# Patient Record
Sex: FEMALE | Race: OTHER | NOT HISPANIC OR LATINO
[De-identification: names, ages, dates, MRNs, and addresses within clinical notes are randomized per-mention and may not be internally consistent; named-entity substitution may affect disease eponyms.]

---

## 2019-01-25 PROBLEM — Z00.00 ENCOUNTER FOR PREVENTIVE HEALTH EXAMINATION: Status: ACTIVE | Noted: 2019-01-25

## 2019-02-04 ENCOUNTER — APPOINTMENT (OUTPATIENT)
Dept: INTERVENTIONAL RADIOLOGY/VASCULAR | Facility: HOSPITAL | Age: 44
End: 2019-02-04
Payer: COMMERCIAL

## 2019-02-04 DIAGNOSIS — Z78.9 OTHER SPECIFIED HEALTH STATUS: ICD-10-CM

## 2019-02-04 DIAGNOSIS — D25.9 LEIOMYOMA OF UTERUS, UNSPECIFIED: ICD-10-CM

## 2019-02-04 DIAGNOSIS — E03.9 HYPOTHYROIDISM, UNSPECIFIED: ICD-10-CM

## 2019-02-04 PROCEDURE — 99244 OFF/OP CNSLTJ NEW/EST MOD 40: CPT

## 2019-02-06 VITALS
HEART RATE: 64 BPM | BODY MASS INDEX: 19.99 KG/M2 | HEIGHT: 69 IN | SYSTOLIC BLOOD PRESSURE: 122 MMHG | WEIGHT: 135 LBS | DIASTOLIC BLOOD PRESSURE: 80 MMHG

## 2019-02-06 PROBLEM — Z78.9 ALCOHOL USE: Status: ACTIVE | Noted: 2019-02-06

## 2019-02-06 PROBLEM — D25.9 FIBROID UTERUS: Status: ACTIVE | Noted: 2019-02-06

## 2019-02-06 PROBLEM — E03.9 HYPOTHYROIDISM, ADULT: Status: ACTIVE | Noted: 2019-02-06

## 2019-02-06 PROBLEM — Z78.9 DOES NOT USE TOBACCO: Status: ACTIVE | Noted: 2019-02-06

## 2019-02-06 RX ORDER — LEVOTHYROXINE SODIUM 137 UG/1
137 TABLET ORAL
Refills: 0 | Status: ACTIVE | COMMUNITY

## 2019-02-06 NOTE — ASSESSMENT
[FreeTextEntry1] : 43 year old with symptomatic fibroids.  Plan for hysteroscopic polypectomy by Dr Lucas followed by MRI pelvis.  If candidate for UAE and symptoms persist then patient will have UAE.

## 2019-02-06 NOTE — CONSULT LETTER
[Dear  ___] : Dear  [unfilled], [Consult Letter:] : I had the pleasure of evaluating your patient, [unfilled]. [Please see my note below.] : Please see my note below. [Consult Closing:] : Thank you very much for allowing me to participate in the care of this patient.  If you have any questions, please do not hesitate to contact me. [Sincerely,] : Sincerely, [FreeTextEntry3] : Pop Avila MD, FSIR\par Chief Interventional Radiology\par [facility]\par [facility]\par

## 2019-02-06 NOTE — PHYSICAL EXAM
[Alert] : alert [No Acute Distress] : no acute distress [Well Nourished] : well nourished [Well Developed] : well developed [Normal Sclera/Conjunctiva] : normal sclera/conjunctiva [EOMI] : extra occular movement intact [No Proptosis] : no proptosis [Normal Oropharynx] : the oropharynx was normal [No Neck Mass] : no neck mass was observed [Supple] : the neck was supple [No Respiratory Distress] : no respiratory distress [No Accessory Muscle Use] : no accessory muscle use [Clear to Auscultation] : lungs were clear to auscultation bilaterally [Normal Rate] : heart rate was normal  [Normal S1, S2] : normal S1 and S2 [Regular Rhythm] : with a regular rhythm [Pedal Pulses Normal] : the pedal pulses are present [No Edema] : there was no peripheral edema [Normal Bowel Sounds] : normal bowel sounds [Not Tender] : non-tender [Soft] : abdomen soft [Not Distended] : not distended [Normal Anterior Cervical Nodes] : anterior cervical nodes [No CVA Tenderness] : no ~M costovertebral angle tenderness [No Spinal Tenderness] : no spinal tenderness [Spine Straight] : spine straight [Normal Gait] : normal gait [Normal Strength/Tone] : muscle strength and tone were normal [No Rash] : no rash [Acanthosis Nigricans___] : no acanthosis nigricans [Normal Reflexes] : deep tendon reflexes were 2+ and symmetric [No Tremors] : no tremors [Oriented x3] : oriented to person, place, and time [Normal Affect] : the affect was normal [Normal Mood] : the mood was normal [de-identified] : uterus palpable to below umbilicus [Fully active, able to carry on all pre-disease performance without restriction] : Fully active, able to carry on all pre-disease performance without restriction

## 2019-02-06 NOTE — HISTORY OF PRESENT ILLNESS
[Dysmenorrhea] : dysmenorrhea [Menorrhagia] : ~T menorrhagia [Pelvic Pain] : pelvic pain [Urinary Frequency] : urinary frequency [Pressure] : pressure [Bloating] : bloating [Last Menstrual Period (___)] : Last menstrual period [unfilled] [Monthly Cycles Regular] : monthly cycles are regular [G ___] : G [unfilled] [P___] : P [unfilled] [FreeTextEntry1] : 43 year old first diagnosed with fibroids 2014.  Abdominal distention, pelvic pressure, frequent urination, menorrhagia 7 days all heavy change pads q hour, clots.  US and sonohysterogram demonstrate three fibroids largest measures 9 cm.  Pap 1/2019 negative.  No desire for future pregnancy [Bleeding In Between Periods] : no bleeding in between periods [Stable] : stable

## 2019-02-06 NOTE — CONSULT LETTER
[Dear  ___] : Dear  [unfilled], [Consult Letter:] : I had the pleasure of evaluating your patient, [unfilled]. [Please see my note below.] : Please see my note below. [Consult Closing:] : Thank you very much for allowing me to participate in the care of this patient.  If you have any questions, please do not hesitate to contact me. [Sincerely,] : Sincerely, [FreeTextEntry3] : Pop Avila MD, FSIR\par Chief Interventional Radiology\par [facility]\par [facility]\par

## 2019-02-06 NOTE — PHYSICAL EXAM
[Alert] : alert [No Acute Distress] : no acute distress [Well Nourished] : well nourished [Well Developed] : well developed [Normal Sclera/Conjunctiva] : normal sclera/conjunctiva [EOMI] : extra occular movement intact [No Proptosis] : no proptosis [Normal Oropharynx] : the oropharynx was normal [No Neck Mass] : no neck mass was observed [Supple] : the neck was supple [No Respiratory Distress] : no respiratory distress [No Accessory Muscle Use] : no accessory muscle use [Clear to Auscultation] : lungs were clear to auscultation bilaterally [Normal Rate] : heart rate was normal  [Normal S1, S2] : normal S1 and S2 [Regular Rhythm] : with a regular rhythm [Pedal Pulses Normal] : the pedal pulses are present [No Edema] : there was no peripheral edema [Normal Bowel Sounds] : normal bowel sounds [Not Tender] : non-tender [Soft] : abdomen soft [Not Distended] : not distended [Normal Anterior Cervical Nodes] : anterior cervical nodes [No CVA Tenderness] : no ~M costovertebral angle tenderness [No Spinal Tenderness] : no spinal tenderness [Spine Straight] : spine straight [Normal Gait] : normal gait [Normal Strength/Tone] : muscle strength and tone were normal [No Rash] : no rash [Acanthosis Nigricans___] : no acanthosis nigricans [Normal Reflexes] : deep tendon reflexes were 2+ and symmetric [No Tremors] : no tremors [Oriented x3] : oriented to person, place, and time [Normal Affect] : the affect was normal [Normal Mood] : the mood was normal [de-identified] : uterus palpable to below umbilicus [Fully active, able to carry on all pre-disease performance without restriction] : Fully active, able to carry on all pre-disease performance without restriction

## 2019-03-31 ENCOUNTER — FORM ENCOUNTER (OUTPATIENT)
Age: 44
End: 2019-03-31

## 2019-04-01 ENCOUNTER — APPOINTMENT (OUTPATIENT)
Dept: MRI IMAGING | Facility: HOSPITAL | Age: 44
End: 2019-04-01

## 2019-04-01 ENCOUNTER — OUTPATIENT (OUTPATIENT)
Dept: OUTPATIENT SERVICES | Facility: HOSPITAL | Age: 44
LOS: 1 days | End: 2019-04-01
Payer: COMMERCIAL

## 2019-04-01 PROCEDURE — 72197 MRI PELVIS W/O & W/DYE: CPT | Mod: 26

## 2019-12-08 ENCOUNTER — FORM ENCOUNTER (OUTPATIENT)
Age: 44
End: 2019-12-08

## 2019-12-09 ENCOUNTER — OUTPATIENT (OUTPATIENT)
Dept: OUTPATIENT SERVICES | Facility: HOSPITAL | Age: 44
LOS: 1 days | End: 2019-12-09
Payer: COMMERCIAL

## 2019-12-09 ENCOUNTER — APPOINTMENT (OUTPATIENT)
Dept: ULTRASOUND IMAGING | Facility: HOSPITAL | Age: 44
End: 2019-12-09
Payer: COMMERCIAL

## 2019-12-09 PROCEDURE — 76830 TRANSVAGINAL US NON-OB: CPT | Mod: 26

## 2019-12-09 PROCEDURE — 76856 US EXAM PELVIC COMPLETE: CPT | Mod: 26

## 2020-01-16 ENCOUNTER — OUTPATIENT (OUTPATIENT)
Dept: OUTPATIENT SERVICES | Facility: HOSPITAL | Age: 45
LOS: 1 days | End: 2020-01-16
Payer: COMMERCIAL

## 2020-01-16 DIAGNOSIS — Z01.818 ENCOUNTER FOR OTHER PREPROCEDURAL EXAMINATION: ICD-10-CM

## 2020-01-16 LAB
ALBUMIN SERPL ELPH-MCNC: 4.7 G/DL — SIGNIFICANT CHANGE UP (ref 3.3–5)
ALP SERPL-CCNC: 46 U/L — SIGNIFICANT CHANGE UP (ref 40–120)
ALT FLD-CCNC: 15 U/L — SIGNIFICANT CHANGE UP (ref 10–45)
ANION GAP SERPL CALC-SCNC: 13 MMOL/L — SIGNIFICANT CHANGE UP (ref 5–17)
APTT BLD: 29.6 SEC — SIGNIFICANT CHANGE UP (ref 27.5–36.3)
AST SERPL-CCNC: 25 U/L — SIGNIFICANT CHANGE UP (ref 10–40)
BILIRUB SERPL-MCNC: 0.4 MG/DL — SIGNIFICANT CHANGE UP (ref 0.2–1.2)
BUN SERPL-MCNC: 15 MG/DL — SIGNIFICANT CHANGE UP (ref 7–23)
CALCIUM SERPL-MCNC: 9.8 MG/DL — SIGNIFICANT CHANGE UP (ref 8.4–10.5)
CHLORIDE SERPL-SCNC: 101 MMOL/L — SIGNIFICANT CHANGE UP (ref 96–108)
CO2 SERPL-SCNC: 26 MMOL/L — SIGNIFICANT CHANGE UP (ref 22–31)
CREAT SERPL-MCNC: 0.71 MG/DL — SIGNIFICANT CHANGE UP (ref 0.5–1.3)
GLUCOSE SERPL-MCNC: 87 MG/DL — SIGNIFICANT CHANGE UP (ref 70–99)
HCG SERPL-ACNC: <0 MIU/ML — SIGNIFICANT CHANGE UP
HCT VFR BLD CALC: 40.6 % — SIGNIFICANT CHANGE UP (ref 34.5–45)
HGB BLD-MCNC: 13.3 G/DL — SIGNIFICANT CHANGE UP (ref 11.5–15.5)
INR BLD: 1.06 — SIGNIFICANT CHANGE UP (ref 0.88–1.16)
MCHC RBC-ENTMCNC: 31.8 PG — SIGNIFICANT CHANGE UP (ref 27–34)
MCHC RBC-ENTMCNC: 32.8 GM/DL — SIGNIFICANT CHANGE UP (ref 32–36)
MCV RBC AUTO: 97.1 FL — SIGNIFICANT CHANGE UP (ref 80–100)
NRBC # BLD: 0 /100 WBCS — SIGNIFICANT CHANGE UP (ref 0–0)
PLATELET # BLD AUTO: 294 K/UL — SIGNIFICANT CHANGE UP (ref 150–400)
POTASSIUM SERPL-MCNC: 4.5 MMOL/L — SIGNIFICANT CHANGE UP (ref 3.5–5.3)
POTASSIUM SERPL-SCNC: 4.5 MMOL/L — SIGNIFICANT CHANGE UP (ref 3.5–5.3)
PROT SERPL-MCNC: 7.1 G/DL — SIGNIFICANT CHANGE UP (ref 6–8.3)
PROTHROM AB SERPL-ACNC: 12.1 SEC — SIGNIFICANT CHANGE UP (ref 10–12.9)
RBC # BLD: 4.18 M/UL — SIGNIFICANT CHANGE UP (ref 3.8–5.2)
RBC # FLD: 12.7 % — SIGNIFICANT CHANGE UP (ref 10.3–14.5)
SODIUM SERPL-SCNC: 140 MMOL/L — SIGNIFICANT CHANGE UP (ref 135–145)
WBC # BLD: 6.31 K/UL — SIGNIFICANT CHANGE UP (ref 3.8–10.5)
WBC # FLD AUTO: 6.31 K/UL — SIGNIFICANT CHANGE UP (ref 3.8–10.5)

## 2020-01-16 PROCEDURE — 93010 ELECTROCARDIOGRAM REPORT: CPT

## 2020-02-27 ENCOUNTER — TRANSCRIPTION ENCOUNTER (OUTPATIENT)
Age: 45
End: 2020-02-27

## 2020-06-10 ENCOUNTER — OUTPATIENT (OUTPATIENT)
Dept: OUTPATIENT SERVICES | Facility: HOSPITAL | Age: 45
LOS: 1 days | End: 2020-06-10
Payer: COMMERCIAL

## 2020-06-10 ENCOUNTER — LABORATORY RESULT (OUTPATIENT)
Age: 45
End: 2020-06-10

## 2020-06-10 DIAGNOSIS — Z01.818 ENCOUNTER FOR OTHER PREPROCEDURAL EXAMINATION: ICD-10-CM

## 2020-06-10 LAB
ALBUMIN SERPL ELPH-MCNC: 4.3 G/DL — SIGNIFICANT CHANGE UP (ref 3.3–5)
ALP SERPL-CCNC: 44 U/L — SIGNIFICANT CHANGE UP (ref 40–120)
ALT FLD-CCNC: 13 U/L — SIGNIFICANT CHANGE UP (ref 10–45)
ANION GAP SERPL CALC-SCNC: 9 MMOL/L — SIGNIFICANT CHANGE UP (ref 5–17)
APTT BLD: 28.6 SEC — SIGNIFICANT CHANGE UP (ref 27.5–36.3)
AST SERPL-CCNC: 23 U/L — SIGNIFICANT CHANGE UP (ref 10–40)
BILIRUB SERPL-MCNC: 0.4 MG/DL — SIGNIFICANT CHANGE UP (ref 0.2–1.2)
BUN SERPL-MCNC: 11 MG/DL — SIGNIFICANT CHANGE UP (ref 7–23)
CALCIUM SERPL-MCNC: 9.7 MG/DL — SIGNIFICANT CHANGE UP (ref 8.4–10.5)
CHLORIDE SERPL-SCNC: 105 MMOL/L — SIGNIFICANT CHANGE UP (ref 96–108)
CO2 SERPL-SCNC: 25 MMOL/L — SIGNIFICANT CHANGE UP (ref 22–31)
CREAT SERPL-MCNC: 0.76 MG/DL — SIGNIFICANT CHANGE UP (ref 0.5–1.3)
GLUCOSE SERPL-MCNC: 81 MG/DL — SIGNIFICANT CHANGE UP (ref 70–99)
HCT VFR BLD CALC: 41.4 % — SIGNIFICANT CHANGE UP (ref 34.5–45)
HGB BLD-MCNC: 13.9 G/DL — SIGNIFICANT CHANGE UP (ref 11.5–15.5)
INR BLD: 0.99 — SIGNIFICANT CHANGE UP (ref 0.88–1.16)
MCHC RBC-ENTMCNC: 32.1 PG — SIGNIFICANT CHANGE UP (ref 27–34)
MCHC RBC-ENTMCNC: 33.6 GM/DL — SIGNIFICANT CHANGE UP (ref 32–36)
MCV RBC AUTO: 95.6 FL — SIGNIFICANT CHANGE UP (ref 80–100)
NRBC # BLD: 0 /100 WBCS — SIGNIFICANT CHANGE UP (ref 0–0)
PLATELET # BLD AUTO: 253 K/UL — SIGNIFICANT CHANGE UP (ref 150–400)
POTASSIUM SERPL-MCNC: 4.5 MMOL/L — SIGNIFICANT CHANGE UP (ref 3.5–5.3)
POTASSIUM SERPL-SCNC: 4.5 MMOL/L — SIGNIFICANT CHANGE UP (ref 3.5–5.3)
PROT SERPL-MCNC: 6.8 G/DL — SIGNIFICANT CHANGE UP (ref 6–8.3)
PROTHROM AB SERPL-ACNC: 11.3 SEC — SIGNIFICANT CHANGE UP (ref 10–12.9)
RBC # BLD: 4.33 M/UL — SIGNIFICANT CHANGE UP (ref 3.8–5.2)
RBC # FLD: 12.8 % — SIGNIFICANT CHANGE UP (ref 10.3–14.5)
SODIUM SERPL-SCNC: 139 MMOL/L — SIGNIFICANT CHANGE UP (ref 135–145)
WBC # BLD: 5.3 K/UL — SIGNIFICANT CHANGE UP (ref 3.8–10.5)
WBC # FLD AUTO: 5.3 K/UL — SIGNIFICANT CHANGE UP (ref 3.8–10.5)

## 2020-06-10 PROCEDURE — 93010 ELECTROCARDIOGRAM REPORT: CPT

## 2020-06-12 ENCOUNTER — OUTPATIENT (OUTPATIENT)
Dept: OUTPATIENT SERVICES | Facility: HOSPITAL | Age: 45
LOS: 1 days | End: 2020-06-12
Payer: COMMERCIAL

## 2020-06-12 ENCOUNTER — RESULT REVIEW (OUTPATIENT)
Age: 45
End: 2020-06-12

## 2020-06-12 ENCOUNTER — APPOINTMENT (OUTPATIENT)
Dept: INTERVENTIONAL RADIOLOGY/VASCULAR | Facility: HOSPITAL | Age: 45
End: 2020-06-12

## 2020-06-12 PROCEDURE — 36247 INS CATH ABD/L-EXT ART 3RD: CPT | Mod: 59

## 2020-06-12 PROCEDURE — 37243 VASC EMBOLIZE/OCCLUDE ORGAN: CPT

## 2020-06-12 RX ORDER — ONDANSETRON 8 MG/1
1 TABLET, FILM COATED ORAL
Qty: 12 | Refills: 0
Start: 2020-06-12 | End: 2020-06-15

## 2020-06-12 RX ORDER — OXYCODONE HYDROCHLORIDE 5 MG/1
1 TABLET ORAL
Qty: 16 | Refills: 0
Start: 2020-06-12 | End: 2020-06-15

## 2020-06-12 RX ORDER — OXYCODONE HYDROCHLORIDE 5 MG/1
1 TABLET ORAL
Qty: 12 | Refills: 0
Start: 2020-06-12 | End: 2020-06-19

## 2020-06-12 RX ORDER — IBUPROFEN 200 MG
1 TABLET ORAL
Qty: 40 | Refills: 0
Start: 2020-06-12 | End: 2020-06-21

## 2020-06-25 RX ORDER — TRAMADOL HYDROCHLORIDE 50 MG/1
1 TABLET ORAL
Qty: 20 | Refills: 0
Start: 2020-06-25 | End: 2020-06-29

## 2020-07-06 ENCOUNTER — APPOINTMENT (OUTPATIENT)
Dept: INTERVENTIONAL RADIOLOGY/VASCULAR | Facility: HOSPITAL | Age: 45
End: 2020-07-06

## 2020-10-13 ENCOUNTER — RESULT REVIEW (OUTPATIENT)
Age: 45
End: 2020-10-13

## 2020-10-13 ENCOUNTER — APPOINTMENT (OUTPATIENT)
Dept: MRI IMAGING | Facility: HOSPITAL | Age: 45
End: 2020-10-13

## 2020-10-13 ENCOUNTER — APPOINTMENT (OUTPATIENT)
Dept: INTERVENTIONAL RADIOLOGY/VASCULAR | Facility: HOSPITAL | Age: 45
End: 2020-10-13

## 2020-10-13 ENCOUNTER — OUTPATIENT (OUTPATIENT)
Dept: OUTPATIENT SERVICES | Facility: HOSPITAL | Age: 45
LOS: 1 days | End: 2020-10-13
Payer: COMMERCIAL

## 2020-10-20 ENCOUNTER — APPOINTMENT (OUTPATIENT)
Dept: INTERVENTIONAL RADIOLOGY/VASCULAR | Facility: HOSPITAL | Age: 45
End: 2020-10-20

## 2020-12-21 ENCOUNTER — APPOINTMENT (OUTPATIENT)
Dept: INTERVENTIONAL RADIOLOGY/VASCULAR | Facility: HOSPITAL | Age: 45
End: 2020-12-21

## 2022-05-16 ENCOUNTER — EMERGENCY (EMERGENCY)
Facility: HOSPITAL | Age: 47
LOS: 1 days | Discharge: HOME | End: 2022-05-16
Attending: EMERGENCY MEDICINE | Admitting: EMERGENCY MEDICINE
Payer: MEDICAID

## 2022-05-16 VITALS
TEMPERATURE: 97 F | RESPIRATION RATE: 18 BRPM | DIASTOLIC BLOOD PRESSURE: 66 MMHG | SYSTOLIC BLOOD PRESSURE: 112 MMHG | HEART RATE: 69 BPM | OXYGEN SATURATION: 98 %

## 2022-05-16 VITALS
HEART RATE: 66 BPM | TEMPERATURE: 97 F | OXYGEN SATURATION: 98 % | SYSTOLIC BLOOD PRESSURE: 118 MMHG | WEIGHT: 134.92 LBS | HEIGHT: 69 IN | DIASTOLIC BLOOD PRESSURE: 77 MMHG | RESPIRATION RATE: 20 BRPM

## 2022-05-16 DIAGNOSIS — R10.31 RIGHT LOWER QUADRANT PAIN: ICD-10-CM

## 2022-05-16 PROCEDURE — 73502 X-RAY EXAM HIP UNI 2-3 VIEWS: CPT | Mod: 26,RT

## 2022-05-16 PROCEDURE — 73502 X-RAY EXAM HIP UNI 2-3 VIEWS: CPT | Mod: 26

## 2022-05-16 PROCEDURE — 99284 EMERGENCY DEPT VISIT MOD MDM: CPT | Mod: 25

## 2022-05-16 RX ORDER — KETOROLAC TROMETHAMINE 30 MG/ML
30 SYRINGE (ML) INJECTION ONCE
Refills: 0 | Status: DISCONTINUED | OUTPATIENT
Start: 2022-05-16 | End: 2022-05-16

## 2022-05-16 RX ORDER — ACETAMINOPHEN 500 MG
650 TABLET ORAL ONCE
Refills: 0 | Status: COMPLETED | OUTPATIENT
Start: 2022-05-16 | End: 2022-05-16

## 2022-05-16 RX ADMIN — Medication 650 MILLIGRAM(S): at 01:25

## 2022-05-16 RX ADMIN — Medication 30 MILLIGRAM(S): at 01:26

## 2022-05-16 NOTE — ED PROVIDER NOTE - OBJECTIVE STATEMENT
47F denies PMH p/w groin pain. ~1mo ago while stretching pt developed pain to R inguinal ligament region, improved after 1week but has still been present for last 4 weeks. Today while walking pain gradually became more severe, radiating to R back and R knee, also hears "clicking" sound at her R hip. took 800mg ibuprofen and then 1/2 percocet w/ minimal relief so came to ED. No other systemic symptoms. Pain worse w/ walking.   Denies HA, f/c, NVD, abd pain, urinary complaints, focal weakness/numbness. Normal PO intake/appetite.

## 2022-05-16 NOTE — ED PROVIDER NOTE - CLINICAL SUMMARY MEDICAL DECISION MAKING FREE TEXT BOX
47F denies PMH p/w groin pain. ~1mo ago while stretching pt developed pain to R inguinal ligament region, improved after 1week but has still been present for last 4 weeks. Today while walking pain gradually became more severe, radiating to R back and R knee, also hears "clicking" sound at her R hip. took 800mg ibuprofen and then 1/2 percocet w/ minimal relief so came to ED. No other systemic symptoms.   Vitals wnl, exam as above.  ddx: Likely muscle/ligament strain/sprain, possible radiculopathy. Possibly related to inguinal ligament or iliotibial band. Clinically not infectious.   XR, symptom control, reassess. Likely outpt f/u.

## 2022-05-16 NOTE — ED PROVIDER NOTE - PATIENT PORTAL LINK FT
You can access the FollowMyHealth Patient Portal offered by [facility] by registering at the following website: [email or web address]. By joining [facility]’s FollowMyHealth portal, you will also be able to view your health information using other applications (apps) compatible with our system.

## 2022-05-16 NOTE — ED ADULT NURSE NOTE - OBJECTIVE STATEMENT
Pt reports injury to R groin 1 month ago. Pt reports yesterday she was walking and felt "something pop" in groin. Pt reports worsening R sided groin pain with radiation to R leg, back, and hip. Pt states she tok ibuprofen without any pain relief. Pt able to ambulate with partial weight to R side. Pt denies any numbness/tingling/weakness.

## 2022-05-16 NOTE — ED PROVIDER NOTE - NSFOLLOWUPINSTRUCTIONS_ED_ALL_ED_FT
Your symptoms are likely related to a strain/sprain of inguinal ligament or iliotibial band.    Can take tylenol 650mg every 6hrs as needed for pain.  Can also take motrin 600mg every 6hrs as needed for pain (take with food, use may cause stomach issues).  Follow up with primary doctor within 1-2 days.  Follow up with orthopedist. Can call [phone] to schedule appointment.   Return for fevers, persistent vomit, uncontrolled pain, focal weakness/numbness, worsening swelling, spreading redness.    Joint Pain    Joint pain (arthralgia) may be caused by many things. Joint pain is likely to go away when you follow instructions from your health care provider for relieving pain at home. However, joint pain can also be caused by conditions that require more treatment. Common causes of joint pain include:  Bruising in the area of the joint.  Injury caused by repeating certain movements too many times (overuse injury).  Age-related joint wear and tear (osteoarthritis).  Buildup of uric acid crystals in the joint (gout).  Inflammation of the joint (rheumatic disease).  Various other forms of arthritis.  Infections of the joint (septic arthritis) or of the bone (osteomyelitis).    Your health care provider may recommend that you take pain medicine or wear a supportive device like an elastic bandage, sling, or splint. If your joint pain continues, you may need lab or imaging tests to diagnose the cause of your joint pain.    Follow these instructions at home:  Managing pain, stiffness, and swelling   If directed, put ice on the painful area. Icing can help to relieve joint pain and swelling.  Put ice in a plastic bag.  Place a towel between your skin and the bag.  Leave the ice on for 20 minutes, 2–3 times a day.  If directed, apply heat to the painful area as often as told by your health care provider. Heat can reduce the stiffness of your muscles and joints. Use the heat source that your health care provider recommends, such as a moist heat pack or a heating pad.  Place a towel between your skin and the heat source.  Leave the heat on for 20–30 minutes.  Remove the heat if your skin turns bright red. This is especially important if you are unable to feel pain, heat, or cold. You may have a greater risk of getting burned.  Move your fingers or toes below the painful joint often. You can avoid stiffness and lessen swelling by doing this.  If possible, raise (elevate) the painful joint above the level of your heart while you are sitting or lying down. To do this, try putting a few pillows under the painful joint.    Activity   Rest the painful joint for as long as directed. Do not do anything that causes or worsens pain.  Begin exercising or stretching the affected area, as told by your health care provider. Ask your health care provider what types of exercise are safe for you.    If you have an elastic bandage, sling, or splint:   Wear the supportive device as told by your health care provider. Remove it only as told by your health care provider.  Loosen the device if your fingers or toes below the joint tingle, become numb, or turn cold and blue.  Keep the device clean.   Ask your health care provider if you should remove the device before bathing. You may need to cover it with a watertight covering when you take a bath or a shower.    General instructions   Take over-the-counter and prescription medicines only as told by your health care provider.  Do not use any products that contain nicotine or tobacco, such as cigarettes and e-cigarettes. If you need help quitting, ask your health care provider.  Keep all follow-up visits as told by your health care provider. This is important.  Contact a health care provider if:  You have pain that gets worse and does not get better with medicine.  Your joint pain does not improve within 3 days.  You have increased bruising or swelling.  You have a fever.  You lose 10 lb (4.5 kg) or more without trying.    Get help right away if:  You cannot move the joint.  Your fingers or toes tingle, become numb, or turn cold and blue.  You have a fever along with a joint that is red, warm, and swollen.    Summary  Joint pain (arthralgia) may be caused by many things.  Your health care provider may recommend that you take pain medicine or wear a supportive device like an elastic bandage, sling, or splint.  If your joint pain continues, you may need tests to diagnose the cause of your joint pain.  Take over-the-counter and prescription medicines only as told by your health care provider. Your symptoms are likely related to a strain/sprain/overuse injury of inguinal ligament or iliotibial band.    Can take tylenol 650mg every 6hrs as needed for pain.  Can also take motrin 600mg every 6hrs as needed for pain (take with food, use may cause stomach issues).  Follow up with primary doctor within 1-2 days.  Follow up with orthopedist. Can call [phone] to schedule appointment.   Return for fevers, persistent vomit, uncontrolled pain, focal weakness/numbness, worsening swelling, spreading redness.    Joint Pain    Joint pain (arthralgia) may be caused by many things. Joint pain is likely to go away when you follow instructions from your health care provider for relieving pain at home. However, joint pain can also be caused by conditions that require more treatment. Common causes of joint pain include:  Bruising in the area of the joint.  Injury caused by repeating certain movements too many times (overuse injury).  Age-related joint wear and tear (osteoarthritis).  Buildup of uric acid crystals in the joint (gout).  Inflammation of the joint (rheumatic disease).  Various other forms of arthritis.  Infections of the joint (septic arthritis) or of the bone (osteomyelitis).    Your health care provider may recommend that you take pain medicine or wear a supportive device like an elastic bandage, sling, or splint. If your joint pain continues, you may need lab or imaging tests to diagnose the cause of your joint pain.    Follow these instructions at home:  Managing pain, stiffness, and swelling   If directed, put ice on the painful area. Icing can help to relieve joint pain and swelling.  Put ice in a plastic bag.  Place a towel between your skin and the bag.  Leave the ice on for 20 minutes, 2–3 times a day.  If directed, apply heat to the painful area as often as told by your health care provider. Heat can reduce the stiffness of your muscles and joints. Use the heat source that your health care provider recommends, such as a moist heat pack or a heating pad.  Place a towel between your skin and the heat source.  Leave the heat on for 20–30 minutes.  Remove the heat if your skin turns bright red. This is especially important if you are unable to feel pain, heat, or cold. You may have a greater risk of getting burned.  Move your fingers or toes below the painful joint often. You can avoid stiffness and lessen swelling by doing this.  If possible, raise (elevate) the painful joint above the level of your heart while you are sitting or lying down. To do this, try putting a few pillows under the painful joint.    Activity   Rest the painful joint for as long as directed. Do not do anything that causes or worsens pain.  Begin exercising or stretching the affected area, as told by your health care provider. Ask your health care provider what types of exercise are safe for you.    If you have an elastic bandage, sling, or splint:   Wear the supportive device as told by your health care provider. Remove it only as told by your health care provider.  Loosen the device if your fingers or toes below the joint tingle, become numb, or turn cold and blue.  Keep the device clean.   Ask your health care provider if you should remove the device before bathing. You may need to cover it with a watertight covering when you take a bath or a shower.    General instructions   Take over-the-counter and prescription medicines only as told by your health care provider.  Do not use any products that contain nicotine or tobacco, such as cigarettes and e-cigarettes. If you need help quitting, ask your health care provider.  Keep all follow-up visits as told by your health care provider. This is important.  Contact a health care provider if:  You have pain that gets worse and does not get better with medicine.  Your joint pain does not improve within 3 days.  You have increased bruising or swelling.  You have a fever.  You lose 10 lb (4.5 kg) or more without trying.    Get help right away if:  You cannot move the joint.  Your fingers or toes tingle, become numb, or turn cold and blue.  You have a fever along with a joint that is red, warm, and swollen.    Summary  Joint pain (arthralgia) may be caused by many things.  Your health care provider may recommend that you take pain medicine or wear a supportive device like an elastic bandage, sling, or splint.  If your joint pain continues, you may need tests to diagnose the cause of your joint pain.  Take over-the-counter and prescription medicines only as told by your health care provider.

## 2022-05-16 NOTE — ED PROVIDER NOTE - PROGRESS NOTE DETAILS
Klepfish: pain improving. Discussed importance of outpt follow up and return precautions. Clinically no indication for further emergent ED workup or hospitalization at this time. Comfortable for dc, outpt f/u.

## 2022-05-16 NOTE — ED PROVIDER NOTE - PHYSICAL EXAMINATION
pain w/ R hip flexion  No spinal ttp, neck FROM. Strength 5/5. No bony ttp, FROM all extremities. Normal equal distal pulses. Steady unassisted gait.   no LE edema, normal equal distal pulses, steady unassisted gait.   no palpable inguinal hernias

## 2022-05-25 ENCOUNTER — APPOINTMENT (OUTPATIENT)
Dept: ORTHOPEDIC SURGERY | Facility: CLINIC | Age: 47
End: 2022-05-25
Payer: MEDICAID

## 2022-05-25 VITALS
HEIGHT: 69 IN | BODY MASS INDEX: 19.99 KG/M2 | SYSTOLIC BLOOD PRESSURE: 157 MMHG | DIASTOLIC BLOOD PRESSURE: 67 MMHG | OXYGEN SATURATION: 97 % | TEMPERATURE: 98.5 F | HEART RATE: 75 BPM | WEIGHT: 135 LBS

## 2022-05-25 DIAGNOSIS — M89.9 DISORDER OF BONE, UNSPECIFIED: ICD-10-CM

## 2022-05-25 PROCEDURE — 99204 OFFICE O/P NEW MOD 45 MIN: CPT

## 2022-05-25 RX ORDER — MELOXICAM 15 MG/1
15 TABLET ORAL
Qty: 28 | Refills: 1 | Status: ACTIVE | COMMUNITY
Start: 2022-05-25 | End: 1900-01-01

## 2022-05-25 NOTE — REVIEW OF SYSTEMS
[Negative] : Heme/Lymph [Joint Pain] : joint pain [Joint Stiffness] : joint stiffness [Joint Swelling] : joint swelling [Feeling Tired] : fatigue [Recent Weight Gain (___ Lbs)] : recent ~M [unfilled] lbs weight gain

## 2022-05-25 NOTE — PHYSICAL EXAM
[de-identified] : General: Well-nourished, well-developed, alert, and in no acute distress.\par Head: Normocephalic.\par Eyes: Pupils equal round reactive to light and accommodation, extraocular muscles intact, normal sclera.\par Nose: No nasal discharge.\par Cardiac: Regular rate. Extremities are warm and well perfused. Distal pulses are symmetric bilaterally.\par Respiratory: No labored breathing.\par Extremities: Sensation is intact distally bilaterally.  Distal pulses are symmetric bilaterally\par Lymphatic: No regional lymphadenopathy, no lymphedema\par Neurologic: No focal deficits\par Skin: Normal skin color, texture, and turgor\par Psychiatric: Normal affect\par MSK: as noted above/below\par \par \par \par LEFT HIP:\par \par Inspection: no bruising, erythema, rash, deformity \par Palpation: No Greater Trochanter pain , ITB pain , Hip Flexor pain  , Piriformis pain , Proximal Hamstring Pain , Groin pain \par ROM: normal Internal Rotation , External Rotation\par Strength: 5/5 Hip Flexion, Hip Extension, Hip Abduction, Hip Adduction\par \par Distal Pulses: normal\par Lower Extremity Sensation: normal \par Patellar/Achilles Reflex: normal\par \par Special Tests:\par Stinchfield: NEGATIVE \par Log Roll: NEGATIVE\par FABERE: NEGATIVE\par FADIR: NEGATIVE\par Obers: NEGATIVE\par Resisted Sit-Up: NEGATIVE\par \par RIGHT HIP:\par \par Inspection: no bruising, erythema, rash, deformity \par Palpation: PAIN AT PUBIC SYMPHISIS/ADDUCTOR TUBERCLE/ADDUCTOR FOOTPRINT, MILD PAIN AT HIP FLEXOR, No Greater Trochanter pain , ITB pain ,, Piriformis pain , Proximal Hamstring Pain , \par ROM: MILDLY INCREASED Internal Rotation , External Rotation\par Strength: 5-/5 Hip Flexion, Hip Extension, Hip Abduction, Hip Adduction\par \par Distal Pulses: normal\par Lower Extremity Sensation: normal \par Patellar/Achilles Reflex: normal\par \par Special Tests:\par Stinchfield: POSITIVE\par Log Roll: NEGATIVE\par FABERE: POSITIVE\par FADIR: POSITIVE\par Obers: NEGATIVE\par Resisted Sit-Up: POSITIVE\par \par  [de-identified] : Xray Pelvis and Bilateral Hip  - Multiple views were reviewed with the patient in detail.  There is no acute fracture or dislocation.  There is no joint effusion.  Joint spaces are preserved.\par

## 2022-05-25 NOTE — HISTORY OF PRESENT ILLNESS
[10] : a current pain level of 10/10 [de-identified] : The patient is a 47 year old woman presenting with right hip pain\par \par She is a former professional dancer.  Until recently, she was doing recreational ballet 2-3 times weekly.\par \par She presents with a 2 month history of chronic right anteromedial hip and groin pain.  She first noticed pain during a ballet class, when she was on a roller, and went into hyperextension of the hip and felt a sharp sensation in her groin area.  She now complains of chronic pain with limited bouts of walking with associated clicking and catching sensations.  Pain is persistent.  Pain radiates into medial thigh near knee as well.  No radiation below knee.  She has history of uterine fibroids, but pain is different in character.  no GI/ symptoms.\par \par Pain is rated 10/10, described as dull, improved with rest, worse with walking.

## 2022-05-25 NOTE — DISCUSSION/SUMMARY
[Medication Risks Reviewed] : Medication risks reviewed [de-identified] : The patient is a 47 year old woman presenting with a 2 month history of chronic, persistent right anteromedial hip pain with mechanical symptoms.  Suspect ANN with labral tear and overlapping athletic pubalgia, cannot rule out proximal adductor tendon tear.\par \par Imaging/Diagnostics/Medical Records were interpreted and/or reviewed and results were reviewed with the patient in detail.  All questions were answered appropriately.\par \par The patient was counseled on the natural progression of the problem(s) today and potential complications of diagnoses.  The patient was provided reassurance today.\par \par MRI of the pelvis ordered today.\par \par MR Arthrogram right hip ordered today.\par \par Patient has exhausted conservative treatment including relative rest, activity modification, at least 8 weeks of physician-directed home exercise program/physical therapy, pharmacologics, observation.  IT IS MEDICALLY NECESSARY TO APPROVE THE FOLLOWING DIAGNOSTIC IMAGING.\par \par Patient was prescribed a short course of Meloxicam.Appropriate use of medication was reviewed with the patient in detail. Risks, benefits, and adverse effects medication were discussed.\par \par Patient was prescribed a short course of Tramadol for breakthrough pain.Appropriate use of medication was reviewed with the patient in detail. Risks, benefits, and adverse effects medication were discussed.\par \par Follow-up after MRI.  Pending results, discussed referral to hip preservation surgery.\par \par ------------------------------------------------------------------------------------------------------------------\par Patient appreciates and agrees with current plan.\par \par The patient's diagnosis above was evaluated by me, personally.  Diagnostic Testing and treatment options were discussed with the patient in detail.  The risks/benefits/potential complications of diagnostic testing/treatments were described in detail.  \par \par This note was generated using a mixture of manual typing and dragon medical dictation software.  A reasonable effort has been made for proofreading its contents, but typos may still remain.  If there are any questions or points of clarification needed please notify my office.\par \par \par >45 minutes of time was spent on total encounter.  >50% of the visit was spent on face-to-face counseling/coordination of care and medical-decision making for this patient.\par \par \par \par

## 2022-06-14 ENCOUNTER — APPOINTMENT (OUTPATIENT)
Dept: ORTHOPEDIC SURGERY | Facility: CLINIC | Age: 47
End: 2022-06-14
Payer: MEDICAID

## 2022-06-14 DIAGNOSIS — M76.892 OTHER SPECIFIED ENTHESOPATHIES OF LEFT LOWER LIMB, EXCLUDING FOOT: ICD-10-CM

## 2022-06-14 DIAGNOSIS — M76.891 OTHER SPECIFIED ENTHESOPATHIES OF RIGHT LOWER LIMB, EXCLUDING FOOT: ICD-10-CM

## 2022-06-14 PROCEDURE — 99212 OFFICE O/P EST SF 10 MIN: CPT

## 2022-06-14 NOTE — HISTORY OF PRESENT ILLNESS
[de-identified] : The patient is a 47 year old woman presenting with right hip pain\par \par She is a former professional dancer.  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  She first noticed pain during a ballet class, when she was on a roller, and went into hyperextension of the hip and felt a sharp sensation in her groin area.  She now complains of chronic pain with limited bouts of walking with associated clicking and catching sensations.  Pain is persistent.  Pain radiates into medial thigh near knee as well.  No radiation below knee.  She has history of uterine fibroids, but pain is different in character.  no GI/ symptoms.\par \par She was sent for MRI Pelvis and MR Arthrogram Right Hip showing:\par MRI of the bony pelvis demonstrates:\par \par 1.  Unremarkable pubic symphysis with intact adductor and aponeurotic attachments.\par 2.  Enlarged fibroid uterus, largest measuring up to 9.5 cm. Bilateral adnexal cysts measuring up to 4 cm the right and 2.4 cm on the left.\par \par MRI of the right hip demonstrates:\par \par 1.  Nondisplaced anterosuperior acetabular labral tear.\par 2.  Focal high-grade articular cartilage defect of the superior femoral head (0.7 x 0.9 cm).\par 3.  Mild gluteus minimus tendinosis and peritendinitis.\par 4.  Mild hamstring tendinosis.\par 5.  Fibroid uterus. 3.5 cm right adnexal cyst

## 2022-06-14 NOTE — DISCUSSION/SUMMARY
[de-identified] : The patient is a 47 year old woman presenting with a >2 month history of chronic, persistent right anteromedial hip pain with mechanical symptoms.  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  She has bilateral adductor tendinitis.\par \par Imaging/Diagnostics/Medical Records were interpreted and/or reviewed and results were reviewed with the patient in detail.  All questions were answered appropriately.\par \par The patient was counseled on the natural progression of the problem(s) today and potential complications of diagnoses.  The patient was provided reassurance today.\par \par Trial PT.\par \par Patient was prescribed a course of physical therapy today.  The patient was also provided some general home exercises.  The patient was counseled on activity modification.\par \par I explained to the patient that I will be leaving at the end of July 2022.  With established patients, I will continue to provide care during that time.  I explained that the Orthopedic team will be sending out letters regarding my departure and we have provided referrals in-office as requested.\par \par She was given referral to Dr. Jacobo.\par \par ------------------------------------------------------------------------------------------------------------------\par Patient appreciates and agrees with current plan.\par \par The patient's diagnosis above was evaluated by me, personally.  Diagnostic Testing and treatment options were discussed with the patient in detail.  The risks/benefits/potential complications of diagnostic testing/treatments were described in detail.  \par \par This note was generated using a mixture of manual typing and dragon medical dictation software.  A reasonable effort has been made for proofreading its contents, but typos may still remain.  If there are any questions or points of clarification needed please notify my office.\par \par I explained to the patient that I will be leaving at the end of July 2022.  With established patients, I will continue to provide care during that time.  I explained that the Orthopedic team will be sending out letters regarding my departure and we have provided referrals in-office as requested.\par >15 minutes of time was spent in total for the encounter.  >50% of the time spent was on face-to-face counseling/coordination of care and medical-decision making for this patient.\par \par \par

## 2022-06-14 NOTE — PHYSICAL EXAM
[de-identified] : General: Well-nourished, well-developed, alert, and in no acute distress.\par Head: Normocephalic.\par Eyes: Pupils equal round reactive to light and accommodation, extraocular muscles intact, normal sclera.\par Nose: No nasal discharge.\par Cardiac: Regular rate. Extremities are warm and well perfused. Distal pulses are symmetric bilaterally.\par Respiratory: No labored breathing.\par Extremities: Sensation is intact distally bilaterally.  Distal pulses are symmetric bilaterally\par Lymphatic: No regional lymphadenopathy, no lymphedema\par Neurologic: No focal deficits\par Skin: Normal skin color, texture, and turgor\par Psychiatric: Normal affect\par MSK: as noted above/below\par \par \par \par LEFT HIP:\par \par Inspection: no bruising, erythema, rash, deformity \par Palpation: MILD PAIN AT LEFT ADDUCTOR MUSCLE BELLY, No Greater Trochanter pain , ITB pain , Hip Flexor pain  , Piriformis pain , Proximal Hamstring Pain , \par ROM: normal Internal Rotation , External Rotation\par Strength: 5/5 Hip Flexion, Hip Extension, Hip Abduction, Hip Adduction\par \par Distal Pulses: normal\par Lower Extremity Sensation: normal \par Patellar/Achilles Reflex: normal\par \par Special Tests:\par Stinchfield: NEGATIVE \par Log Roll: NEGATIVE\par FABERE: NEGATIVE\par FADIR: NEGATIVE\par Obers: NEGATIVE\par Resisted Sit-Up: NEGATIVE\par \par RIGHT HIP:\par \par Inspection: no bruising, erythema, rash, deformity \par Palpation: PAIN AT PUBIC SYMPHISIS/ADDUCTOR TUBERCLE/ADDUCTOR FOOTPRINT, MILD PAIN AT HIP FLEXOR, No Greater Trochanter pain , ITB pain ,, Piriformis pain , Proximal Hamstring Pain , \par ROM: MILDLY INCREASED Internal Rotation , External Rotation\par Strength: 5-/5 Hip Flexion, Hip Extension, Hip Abduction, Hip Adduction\par \par Distal Pulses: normal\par Lower Extremity Sensation: normal \par Patellar/Achilles Reflex: normal\par \par Special Tests:\par Stinchfield: POSITIVE\par Log Roll: NEGATIVE\par FABERE: POSITIVE\par FADIR: POSITIVE\par Obers: NEGATIVE\par Resisted Sit-Up: POSITIVE\par \par  [de-identified] : MRI of the bony pelvis demonstrates:\par \par 1.  Unremarkable pubic symphysis with intact adductor and aponeurotic attachments.\par 2.  Enlarged fibroid uterus, largest measuring up to 9.5 cm. Bilateral adnexal cysts measuring up to 4 cm the right and 2.4 cm on the left.\par \par MRI of the right hip demonstrates:\par \par 1.  Nondisplaced anterosuperior acetabular labral tear.\par 2.  Focal high-grade articular cartilage defect of the superior femoral head (0.7 x 0.9 cm).\par 3.  Mild gluteus minimus tendinosis and peritendinitis.\par 4.  Mild hamstring tendinosis.\par 5.  Fibroid uterus. 3.5 cm right adnexal cyst

## 2022-07-07 ENCOUNTER — APPOINTMENT (OUTPATIENT)
Dept: INTERVENTIONAL RADIOLOGY/VASCULAR | Facility: HOSPITAL | Age: 47
End: 2022-07-07
Payer: COMMERCIAL

## 2022-07-07 PROCEDURE — XXXXX: CPT | Mod: 1L

## 2022-07-18 ENCOUNTER — OUTPATIENT (OUTPATIENT)
Dept: OUTPATIENT SERVICES | Facility: HOSPITAL | Age: 47
LOS: 1 days | End: 2022-07-18
Payer: MEDICAID

## 2022-07-18 ENCOUNTER — APPOINTMENT (OUTPATIENT)
Dept: MRI IMAGING | Facility: HOSPITAL | Age: 47
End: 2022-07-18

## 2022-07-18 ENCOUNTER — RESULT REVIEW (OUTPATIENT)
Age: 47
End: 2022-07-18

## 2022-07-18 PROCEDURE — 72197 MRI PELVIS W/O & W/DYE: CPT | Mod: 26

## 2022-09-16 ENCOUNTER — APPOINTMENT (OUTPATIENT)
Dept: ORTHOPEDIC SURGERY | Facility: CLINIC | Age: 47
End: 2022-09-16

## 2022-09-16 DIAGNOSIS — M19.90 UNSPECIFIED OSTEOARTHRITIS, UNSPECIFIED SITE: ICD-10-CM

## 2022-09-16 PROCEDURE — 99214 OFFICE O/P EST MOD 30 MIN: CPT

## 2022-09-26 NOTE — PHYSICAL EXAM
[de-identified] : General appearance: well nourished and hydrated, pleasant, alert and oriented x 3, cooperative.  \par HEENT: normocephalic, EOM intact, wearing mask, external auditory canal clear.  \par Cardiovascular: no lower leg edema, no varicosities, dorsalis pedis pulses palpable and symmetric.  \par Lymphatics: no palpable lymphadenopathy, no lymphedema.  \par Neurologic: sensation is normal, no muscle weakness in upper or lower extremities, patella tendon reflexes present and symmetric.  \par Dermatologic: skin moist, warm, no rash.  \par Spine: cervical spine with normal lordosis and painless range of motion, thoracic spine with normal kyphosis and painless range of motion, lumbosacral spine with normal lordosis and painless range of motion.  No tenderness to palpation along midline spine and paraspinal musculature.  Sacroiliac joints nontender bilaterally. Negative SLR and crossed SLR tests bilaterally.\par Gait: normal.  \par \par Left knee: all fields negative/normal/unremarkable unless otherwise noted -- \par - Focal soft tissue swelling: \par - Ecchymosis: \par - Erythema: \par - Effusion: moderate, palpable baker's cyst\par - Wounds: \par - Alignment: \par - Tenderness: medial joint line/ patella compression TTP\par - ROM: 0-145\par - Collateral laxity: stable\par - Cruciate laxity: stable\par - Popliteal angle (degrees): \par - Quad strength: \par \par Right knee: all fields negative/normal/unremarkable unless otherwise noted --\par - Focal soft tissue swelling: \par - Ecchymosis: \par - Erythema: \par - Effusion: none\par - Wounds: \par - Alignment: normal\par - Tenderness: mild medial joint line TTP\par - ROM: 0-145\par - Collateral laxity: stable\par - Cruciate laxity: stable\par - Popliteal angle (degrees): \par - Quad strength: \par \par Left hip: all fields negative/normal/unremarkable unless otherwise noted --\par - Focal soft tissue swelling: \par - Ecchymosis: \par - Erythema: \par - Wounds: \par - Tenderness: \par - ROM: \par   - Flexion: 135\par   - Extension: 0\par   - Adduction: 30\par   - Abduction: 45\par   - Internal rotation in 90 degrees of hip flexion:25 \par   - External rotation in 90 degrees of hip flexion: 60\par - LILIANA: \par - FADIR: \par - Trell: \par - Stinchfield: negative\par - Flexor power: 5/5\par - Abductor power: 5/5\par \par Right hip: all fields negative/normal/unremarkable unless otherwise noted --\par - Focal soft tissue swelling: \par - Ecchymosis: \par - Erythema: \par - Wounds: \par - Tenderness: \par - ROM: \par   - Flexion: 135 pain on max flexion\par   - Extension: 0\par   - Adduction: 15\par   - Abduction: 40\par   - Internal rotation in 90 degrees of hip flexion: 15\par   - External rotation in 90 degrees of hip flexion: 20\par - LILIANA: positive\par - FADIR: positive\par - Trell: \par - Stinchfield: negative\par - Flexor power: 5/5\par - Abductor power: 5/5\par  [de-identified] : Pelvis and right hip XRs were interpreted by me and reviewed with the patient.\par \par Location of imaging: [facility], under MRN [id number]\par Date of exam: 5/16/22\par \par Pelvic alignment: normal\par \par Right hip --\par Arthritis: none\par Deformity: none\par Osteonecrosis: none\par \par Left hip --\par Arthritis: none\par Deformity: none\par Osteonecrosis: none\par \par MRI pelvis and MRI arthrogram of the right hip were also reviewed from [facility], both dated 6/6/22. Relevant findings:\par - Right acetabular labral tear (nondisplaced)\par - Focal cartilage defect right femoral head\par - No stress fracture or osteonecrosis\par - Mild multifocal abductor tendinosis

## 2022-09-26 NOTE — DISCUSSION/SUMMARY
[de-identified] : 46 y/o female with right hip labral tear, left knee inflammatory arthropathy\par - Tylenol as needed for pain\par - HEP\par - Referral to sports medicine to discuss labral repair\par - Uric acid serum blood work to access for inflammatory arthropathy\par - Follow up as needed

## 2022-09-26 NOTE — HISTORY OF PRESENT ILLNESS
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 Localizes the hip pain to the right groin, lateral thigh and low back. She is a former patient of Dr. Bullock who prescribed her a course of PT. States she did not gain relief from PT and that her symptoms have became worst. Taking Tylenol + Advil as needed for pain. Modifying factors include walking and bending. Notes a walking distance limitation of 10 minutes before experiencing pain. \par \par PMH: Lupus following with Rheumatologists [de-identified] : patient states pain is sharp and achy / radiating

## 2022-09-29 LAB — URATE SERPL-MCNC: 5.2 MG/DL

## 2022-09-29 RX ORDER — TRAMADOL HYDROCHLORIDE 50 MG/1
50 TABLET, COATED ORAL
Qty: 14 | Refills: 0 | Status: ACTIVE | COMMUNITY
Start: 2022-05-25 | End: 1900-01-01

## 2022-10-03 ENCOUNTER — APPOINTMENT (OUTPATIENT)
Dept: ORTHOPEDIC SURGERY | Facility: CLINIC | Age: 47
End: 2022-10-03

## 2022-10-20 ENCOUNTER — APPOINTMENT (OUTPATIENT)
Dept: ORTHOPEDIC SURGERY | Facility: CLINIC | Age: 47
End: 2022-10-20

## 2022-10-20 DIAGNOSIS — M25.851 OTHER SPECIFIED JOINT DISORDERS, RIGHT HIP: ICD-10-CM

## 2022-10-20 DIAGNOSIS — S73.191A OTHER SPRAIN OF RIGHT HIP, INITIAL ENCOUNTER: ICD-10-CM

## 2022-10-20 PROCEDURE — 99214 OFFICE O/P EST MOD 30 MIN: CPT

## 2023-01-06 NOTE — ASU PATIENT PROFILE, ADULT - FALL HARM RISK - UNIVERSAL INTERVENTIONS
Bed in lowest position, wheels locked, appropriate side rails in place/Call bell, personal items and telephone in reach/Instruct patient to call for assistance before getting out of bed or chair/Non-slip footwear when patient is out of bed/[facility] to call system/Physically safe environment - no spills, clutter or unnecessary equipment/Purposeful Proactive Rounding/Room/bathroom lighting operational, light cord in reach

## 2023-01-06 NOTE — ASU PATIENT PROFILE, ADULT - NSICDXPASTSURGICALHX_GEN_ALL_CORE_FT
PAST SURGICAL HISTORY:  History of surgery Hysteroscopy    History of surgery fibroid embolization

## 2023-01-08 ENCOUNTER — TRANSCRIPTION ENCOUNTER (OUTPATIENT)
Age: 48
End: 2023-01-08

## 2023-01-09 ENCOUNTER — TRANSCRIPTION ENCOUNTER (OUTPATIENT)
Age: 48
End: 2023-01-09

## 2023-01-09 ENCOUNTER — OUTPATIENT (OUTPATIENT)
Dept: OUTPATIENT SERVICES | Facility: HOSPITAL | Age: 48
LOS: 1 days | Discharge: HOME | End: 2023-01-09
Payer: MEDICAID

## 2023-01-09 VITALS
WEIGHT: 134.48 LBS | HEIGHT: 69 IN | SYSTOLIC BLOOD PRESSURE: 94 MMHG | OXYGEN SATURATION: 97 % | RESPIRATION RATE: 14 BRPM | DIASTOLIC BLOOD PRESSURE: 58 MMHG | TEMPERATURE: 98 F | HEART RATE: 65 BPM

## 2023-01-09 VITALS
OXYGEN SATURATION: 96 % | RESPIRATION RATE: 16 BRPM | TEMPERATURE: 97 F | DIASTOLIC BLOOD PRESSURE: 75 MMHG | HEART RATE: 65 BPM | SYSTOLIC BLOOD PRESSURE: 106 MMHG

## 2023-01-09 DIAGNOSIS — Z98.890 OTHER SPECIFIED POSTPROCEDURAL STATES: Chronic | ICD-10-CM

## 2023-01-09 PROCEDURE — 88305 TISSUE EXAM BY PATHOLOGIST: CPT | Mod: 26

## 2023-01-09 DEVICE — MYOSURE TISSUE REMOVAL DEVICE REACH: Type: IMPLANTABLE DEVICE | Status: FUNCTIONAL

## 2023-01-09 DEVICE — MYOSURE TISSUE REMOVAL DEVICE XL: Type: IMPLANTABLE DEVICE | Status: FUNCTIONAL

## 2023-01-09 RX ORDER — FENTANYL CITRATE 50 UG/ML
50 INJECTION INTRAVENOUS
Refills: 0 | Status: DISCONTINUED | OUTPATIENT
Start: 2023-01-09 | End: 2023-01-09

## 2023-01-09 RX ORDER — SODIUM CHLORIDE 9 MG/ML
500 INJECTION, SOLUTION INTRAVENOUS
Refills: 0 | Status: DISCONTINUED | OUTPATIENT
Start: 2023-01-09 | End: 2023-01-09

## 2023-01-09 RX ORDER — TRAMADOL HYDROCHLORIDE 50 MG/1
25 TABLET ORAL ONCE
Refills: 0 | Status: DISCONTINUED | OUTPATIENT
Start: 2023-01-09 | End: 2023-01-09

## 2023-01-09 RX ORDER — SODIUM CHLORIDE 9 MG/ML
1000 INJECTION, SOLUTION INTRAVENOUS ONCE
Refills: 0 | Status: COMPLETED | OUTPATIENT
Start: 2023-01-09 | End: 2023-01-09

## 2023-01-09 RX ORDER — HYDROMORPHONE HYDROCHLORIDE 2 MG/ML
0.5 INJECTION INTRAMUSCULAR; INTRAVENOUS; SUBCUTANEOUS
Refills: 0 | Status: DISCONTINUED | OUTPATIENT
Start: 2023-01-09 | End: 2023-01-09

## 2023-01-09 RX ADMIN — SODIUM CHLORIDE 1000 MILLILITER(S): 9 INJECTION, SOLUTION INTRAVENOUS at 13:30

## 2023-01-09 NOTE — PRE-ANESTHESIA EVALUATION ADULT - NSANTHADDINFOFT_GEN_ALL_CORE
Pt accepts all anesthesia risks IBNLT: Dental, Pharyngeal, Laryngeal, Tracheal Trauma, Sore Throat, Hoarseness, Recurrent Laryngeal Nerve Dysfunction, Upper and Lower Extremity Paresthesias, Nausea and Vomiting, Potential exacerbation of asthma and EVON.

## 2023-01-09 NOTE — BRIEF OPERATIVE NOTE - OPERATION/FINDINGS
endocervical polyp at entrance of external os, unable to identify internal os, resected polyp with myosure, fluid deficit 850cc

## 2023-01-09 NOTE — ASU DISCHARGE PLAN (ADULT/PEDIATRIC) - NS MD DC FALL RISK RISK
For information on Fall & Injury Prevention, visit: [email or web address] OR  [email or web address] OR  https://www.cdc.gov/steadi/patient.html

## 2023-01-09 NOTE — ASU DISCHARGE PLAN (ADULT/PEDIATRIC) - CARE PROVIDER_API CALL
Geno Ritchie)  Obstetrics and Gynecology  [address], [address]  [place], [place] [address]  Phone: [phone]  Fax: [phone]  Follow Up Time:

## 2023-01-09 NOTE — PRE-ANESTHESIA EVALUATION ADULT - NSANTHOSAYNRD_GEN_A_CORE
No. EVON screening performed.  STOP BANG Legend: 0-2 = LOW Risk; 3-4 = INTERMEDIATE Risk; 5-8 = HIGH Risk

## 2023-01-11 LAB
SURGICAL PATHOLOGY STUDY: SIGNIFICANT CHANGE UP

## 2023-08-15 NOTE — ED PROVIDER NOTE - WET READ LAUNCH FT
[Disease:__________________________] : Disease: [unfilled] [Treatment Protocol] : Treatment Protocol [de-identified] : Initial Presentation:\par \par 70 yo with MHx significant for Atrial flutter (on Apixaban), HTN, here for further evaluation of low-level neutrophilia (since 1/2022) and lymphadenopathy. Previously NHL (around 5116-2190?). He received chemotherapy in 2004. 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 In 2010 he went to [place] and was treated for reoccurrence and was treated with bendamustine. He reports that he has had areas of swelling in his neck on and off for about 2 years which was mostly undergoing workup with his endocrinologist. In the last few months he has noticed increasing size of his left cervical LNs and a right nodule that caused swelling of his face, which has now spontaneously decreased in size significantly.\par \par Today he reports he feels well outside of weight loss. He denies any other constitutional complaints. He weighed 192 lbs in 10/2021 which was down to 183 lbs in December 12/2021(2 months later) which has further decreased down to 179 lbs today. He has not felt himself masses outside of his neck region. On 5/14/22, he went for US duplex of his left lower extremity due to a "tangerine size lump" on the back of his left thigh, which noted a 4.4 cm hypoechoic mass in his left inguinal region without commenting on his perceived posterior thigh mass. Also, on 5/2/22 he underwent US of his thyroid and parathyroid glands where they noticed bilateral cervical lymph nodes with the largest on the left side measuring 2.1 x 1.6 x 1.9 cm and a right level 1 LN measuring 2.2 x 1.2 x 2.3 cm. They saw 3 lymph nodes on the left side and one discrete LN on the right.\par \par  He also recently just finished a 14 day course of Levofloxacin for an uncomplicated phenomena. He was having a dry cough and underwent imaging as an outpatient which found mild left and right pleural effusions, areas of consolidation on the right and retrocardiac airspace involvement (performed 5/2/22). He now feels better without infectious complaints.\par \par In addition, he is currently anemic. He last had a colonoscopy in his recent memory. He had bleeding hemorrhoids last year treated with OTC medications. He denies any tick bites recently. \par \par He also has MGUS with 3 separate monoclonal gammopathies I suspect is related to his NHL. He has M Judd 1 showing IgG Lambda at 0.3 g/dl, M Judd 2 showing IgG Kappa at 0.2 g/dl, M Judd 3 showing IgM Lambda (unable to quantitate). There is no suspicion for myeloma or waldenstroms at this time and his M-spikes will be monitored. He has no elevation in kappa/ lambda FLC ration, but individual light chains are both elevated, kappa at 10.77 mg/dL and lambda at 6.58 mg/dL.\par \par Social Hx\par - He is currently retired. He used to be an .\par - No significant environmental exposure to chemicals\par - Lives by himself and his wife lives in [place].\par - Former smoker. He smoked for 10 years less than 1 pack a day. He quit 20 years ago\par \par Family hx\par - Two brothers non Hodgkins lymphoma. At least one required chemotherapy. sister had cervical cancer first diagnosed 2007 and then 3 years later with more cancer discovered\par - Mother and father passed away from heart disease and diabetes.\par \par =======================================================\par Interval Hx update:\par \par He underwent PET-CT in Aug 2022 which showed FDG avid lymph nodes in the left cervical, bilateral supraclavicular regions, and chest, and a few FDG avid abdominal lymph nodes, intramuscular masses in the left posterior chest and left upper thigh, scattered FDG avid subcutaneous soft tissue/nodules with trace right pleural effusion, moderate left pleural effusion, loculated fluid in the right middle lobe. Moderate abdominal and pelvic ascites. Subcutaneous edema in the lower abdominal wall extending into the imaged bilateral thighs. Splenomegaly with mild FDG avidity, suspicious for lymphomatous involvement.\par \par Prior to the PET-CT, he underwent biopsy of both a cervical lymph node and a left axilla lymph node. The left axillary core biopsy showed grade 3A Follicular lymphoma that was positive for a BCL6 (3q27) breakpoint translocation and negative for MYC Rearrangement or BCL2-IGH gene rearrangement [translocation t(14;18) present in ~ 85% of [place]]. There were areas of > 20 SUV on the PET-CT, repeat whole lymph node biopsy was requested to confirm suspected diagnosis of Grade 3B FL or transformed large cell lymphoma. S/p excisional biopsy of back lesion on 9/7/22 which showed recurrent DLBCL.\par \par LP 9/26/22: protein elevated at 61, LDH 27, neutrophils 0, lymphocytes 33, monocytes 67, RBC 5. Oligoclonal bands negative. The flow cytometry failed due to insufficient cells. Recommended continued IT MTX therapy x 4 total cycles. He had an interim PET-CT performed on 2/1/23 (3 months after discontinuation of O-miniCHOP in the middle of his therapy - received 3/6 cycles) which showed possible persistent disease including a small, residual focus of increased FDG activity in the left level II region, likely corresponding to a difficult to delineate lymph node, is decreased in size and metabolism (SUV 3.5; image 33; previous SUV 16.1 and skin thickening and subcutaneous nodules, right lower anterior and lateral abdominal wall, slightly more extensive and similar in metabolism (SUV 7.1; image 170; previous SUV 5.5). Discontinued chemoimmunotherapy with Obi-miniCHOP in Nov 2022 due to treatment complications, hospitalizations, poor PS. He was subsequently started on Tafasitamab (anti-CD19) + lenalidomide since 3/30/23. Lenalidomide dose reduced from 20 mg to 15 mg due to episodes of severe neutropenia.\par \par =======================================================\par Care Providers:\par \par PMD/ Geriatrician: Dr Allison; Tel: [phone]\par Endo: Dr Carter Vigil\par Cardiologist: Dr. Don [phone] fax [phone]\par \par =======================================================\par Contacts:\par \par Vonnie (daughter): [phone] - takes all healthcare related calls\par \par ======================================================= [de-identified] : PENDING [de-identified] : Fine Needle Aspiration Report - Auth (Verified)\par Specimen(s) Submitted\par 1. AXILLA, LEFT, US GUIDED CORE BIOPSY AND FNA\par 2. LYMPH NODE, CERVICAL, LEFT POSTERIOR, US GUIDED CORE BIOPSY AND FNA\par \par \par Final Diagnosis\par 1. AXILLA, LEFT, US GUIDED CORE BIOPSY AND FNA\par POSITIVE FOR MALIGNANT CELLS.\par B-cell lymphoma of follicular center origin, most consistent with\par follicular lymphoma, grade 3A.  See note.\par \par Fine Needle Aspiration Addendum Report - Auth (Verified)\par \par Addendum\par 2. LYMPH NODE, CERVICAL, LEFT POSTERIOR, US GUIDED CORE BIOPSY AND FNA\par POSITIVE FOR MALIGNANT CELLS.\par B-cell lymphoma of follicular center origin with high proliferation index.\par See note.\par \par Note:\par The neoplastic B cells demonstrate a follicular center B-cell phenotype with MUM-1 co-expression and a high proliferation index.  Follicular dendritic meshwork is present in most areas of the biopsy, consistent\par with follicular lymphoma.  However, there is one focal area with increased larger cells and lack of follicular dendritic meshwork. Therefore, a high grade component can not be excluded.  If clinically indicated, suggest excisional biopsy for definitive classification.\par \par Immunohistochemical stains   (CD3, CD5, CD10, CD20, CD21, CD23, CD30, BCL-6, BCL-2, cyclinD1, ki-67, c-MYC, PAX-5, MUM-1, p53, ISAURO) were performed on block 2C.  The neoplastic cells are positive for CD20, PAX-5, BCL-6, BCL-2, MUM-1 (partial), dim p53; negative CD5, CD10, CD30, cyclinD1, ISAURO.  CD21 and CD23 highlight follicular dendritic meshwork in most areas with focal absence of follicular dendritic meshwork. \par Ki-67 proliferation index is approximately 60 to 70%.  C-MYC stains approximately 20 to 30% of cells.  CD3 and CD5 highlight some T-cells in the background. [de-identified] : 6/14/22 FNA of Left axilla LN: FLUORESCENCE IN-SITU HYBRIDIZATION (FISH)\par 1. No evidence of MYC Rearrangement\par 2. No evidence of BCL2-IGH [translocation t(14;18)] gene rearrangement\par 3. Positive for BCL6 (3q27) breakpoint translocation. [de-identified] : 5/18/22: Initial Visit.  6/20/22: Follow-up. Patient feels well overall. Lymphoma labs and left axilla LN biopsy revealed grade 3A follicular lymphoma, IgG Lambda and Kappa MGUS. He reports lymph nodes have been stable. Heart function is stable. He denies having any recent fevers, chills, nausea. No weight changes.  8/22/22: Follow-up. Patient feels well overall. Awaiting for biopsy results of lymph nodes in his back. He does not have pain in the left back. The left side of his neck gives him discomfort. He has never received chemotherapy nor antibody treatment in the past. No fevers, chills, night sweats. No new noticeable masses  Good appetite, lost 7 lbs (lost a lot of fluid weight due to diuretics).   10/17/22: Follow up. In the interim, he was hospitalized at [facility] twice (8/27-9/12 & 9/16-10/3). 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 Patient was treated with rasburicase, but developed rasburicase-triggered G6PD hemolysis. Also found to be in acute exacerbation of HFrEF and have a prolonged QTc (likely medication-induced). During hospital stay was found to have altered mental status. 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 Origin of CVA was embolic given patient history of afib; eliquis was being held, resumed afterwards. In mid-September patient was hospitalized for Encephalopathy (+Rhinovirus/enterovirus) unrelated to the Lymphoma. Today, he feels at baseline. Notes resolution of cervical LNs, and back lesions since starting treatment. Patient denies fever, chills, night sweats, back pain, abdominal pain, chest pain, or shortness of breath. Fair appetite, weight loss due to prolonged hospitalizations.  11/17/22: Follow-up. On 10/28/22 at home was found down by his daughter found to have fever, STEPHEN and AMS and was admitted for acute metabolic encephalopathy with sepsis 2/2 pneumonia s/p 7 days zosyn with improvement. Today he presents from rehab with his daughter. He is not feeling well. He feels that he is weak and fatigued. He has been doing PT / walking around the facility. He reads when awake. He has a poor appetite, but his family is bringing in food that he likes. No fevers, chills, night sweats. No new lumps or masses.  2/6/23: Follow-up. He was readmitted [facility] 1/9/23 to 1/20/23 for malaise and cytopenias. In December 2022 he had pneumonia and COVID19 for which there has been a long recovery. Due to these complications, his treatment with chemoimmunotherapy (O-miniCHOP) was discontinued. He is currently staying in a rehab to improve his performance status. He overall feels well today and reports feeling much improved relative to Dec 2022. He eats 3 meals a day, but prefers to eat late at night. His appetite is good and his daughter brings a lot of food supplementation for him. He continues to lose weight however. No other recent illnesses. He had a PET-CT last week.  3/13/23: Followup. He has not yet rec'd revlimid but is now approved for free drug. Continues to have issues gaining weight, and reports a very good appetite. He has not lost weight at least. He also has been having right foot pain between the ankle and toes since the night of 3/6/23. He also has a rash on toes 1-3. He continues to have right lateral abdominal itchiness around a site of swelling / lump. This has not changed in the past month. He is now back home. He is able to ambulate with a walker. He is not limited by dyspnea. He had edema in the rehab which has resolved. Per family he has also been having intermittent periods where he is not himself and he becomes aggressive toward family members. This has been an ongoing issue for sometime   3/30/23: Follow-up. Today is Cycle 1 Day 1 of Tafa. He has not yet rec'd revlimid (expected delivery today); is approved for free drug via [employer]. Reports intentional weight gain over the past couple of weeks and ambulating with a walker. Continues to have right foot pain between the ankle and toes since the night of 3/6/23, and have right lateral abdominal itchiness around a site of swelling / lump. This has not changed in the past month. He is now back home. Per family he has intermittent periods where he is not himself and he becomes aggressive toward family members.   4/6/23: Follow-up. Today is Cycle 1 Day 8 of Tafasitamab. Did receive revlimid on day 1 and has been tolerating Revlimid well. Denies any abdominal issues, continues to have a good appetite. Ambulating with a walker but continues to have right foot pain, and have right lateral abdominal itchiness around a site of swelling / lump. Per family he has intermittent periods where he is not himself and he becomes aggressive toward family members.   4/13/23: Follow-up. Today is Cycle 1 Day 15 of Tafasitamab. He is tolerating the regimen well. Denies any abdominal issues, continues to have a good appetite. Ambulating with a walker but continues to have right foot pain, and have right lateral abdominal itchiness around a site of swelling / lump. Per family he has intermittent periods where he is not himself and he becomes aggressive toward family members.   4/20/23: Follow-up. Today is Cycle 1 Day 22. Today he reports feeling well, but has noticed his right leg / foot is swollen and associated with shoe tightness. He reports that he has been taking his apixaban which he takes for cardiac reasons. His ANC is 0, but denies any mucositis, or other infectious issues. No new lumps or masses. His right abd mass is decreasing in size.  4/27/23: Follow-up. Today is Cycle 1 Day 29. After receiving zarxio he developed a facial rash which is now self resolved. He remains off of Revlimid due to neutropenia. Has the mediport insertion scheduled for May 9th. Today he reports feeling well, his right leg / foot remains stable and swollen; associated with shoe tightness. US Duplex (4/20/23) negative for DVT. He reports that he has been taking his apixaban which he takes for cardiac reasons. Denies any mucositis, or other infectious issues. No new lumps or masses. His right abd mass is decreasing in size.  5/4/23: Follow-up. Today is Cycle 1 Day 36; has been unable to receive Tafa due to peripheral access limitation therefore cycle 2 has not been counted. He has restarted Revlimid at a lowered dose of 15mg due to neutropenia, tolerating well without neutropenia thus far. He has the mediport insertion scheduled for May 9th. Today he reports feeling well, his right leg / foot remains stable and swollen;US Duplex (4/20/23) negative for DVT. Remains on apixaban which he takes for cardiac reasons. Denies any mucositis, or other infectious issues. No new lumps or masses. His right abd mass is no longer palpable.  5/25/23: Follow-up. Today is Cycle 2 Day 15. He has a mediport in place now and is able to receive the Tafasitamab without issue, tolerating Revlimid well at a lowered dose of 15mg due to neutropenia. Today he reports feeling well, his intermittent right leg swelling is much improved. Has been hydrating well recently, given elevated BUN. Remains on apixaban which he takes for cardiac reasons. Denies any bleeding, mucositis, masses/lumps, fever, chills, or night sweats. Good appetite.  6/19/23: Follow-up. Today is Cycle 3 Day 12 of Tafa + Revlimid. Due to neutropenia about 2 weeks ago he was again advised to hold the Revlimid until further notice. He has been having swelling in his hands, left > right with significant pain (gout-like) in the left thumb. He developed a sore throat yesterday at home and was using honey to help soothe. Today he only has a sore throat when swallowing liquids. He was aslo having chills, confused and having visual hallucinations.  He denies chest pain or shortness of breath. He ambulated into the center today, per daughter he has been walking much slower. He denies any issues with bowel or urinary function. He reports he is hydrating a couple times a day per daughter up to 500 mL a day. He is still off revlimid. Blood pressure rechecked 86/58.  7/26/23: Follow-up. He feels well today. He has noticed all of his prior lymphadenopathy disappear. He is not noticing any new lumps or masses. He denies constitutional issues. His neutrophils remain in the severely low range, however he has not developed any infections. He has been taking abx ppx. We will hold tafa as it can also negatively affect neutrophils. He has a left epicondylar mass that he states he has had for a very long time (years), which we will watch.  A comprehensive review of systems was performed including constitutional, eyes, ENT, cardiovascular, respiratory, gastrointestinal, genitourinary, musculoskeletal, integumentary, neurological, psychiatric and hematologic / lymphatic. All pertinent positives are included in the H&P under interval history above and the remaining review of systems listed are negative.   ==================================================== Treatment Hx:  Obinutuzumab-mini-COEP q21 days x 3 cycles (incomplete due to toxicities and patient preference to switch to more tolerable regimen) (Obinutuzumab modified mini-COEP: 400 mg/m cyclophosphamide, Etoposide (40% dose reduced to 30 mg/m2 IV on day 1 and 60 mg/m2 PO on days 2 and 3 of each cycle), and 2 mg vincristine (flat dose) on day 1 of each cycle, and 100 mg prednisone on days 1-5. Modified from Man et al 2009 Blood "R-CHOP with Etoposide Substituted for Doxorubicin (R-CEOP): Excellent Outcome in Diffuse Large B Cell Lymphoma for Patients with a Contraindication to Anthracyclines." with mini- dosing for elderly patients) - Cycle 1 Day 1 given 9/2/22 - third dose of Obinutuzumab held due to AMS, requiring admission to [facility] found to have enterovirus infection - Cycle 2 Day 1 given 9/30/22 - Given as an inpatient at [facility] - Cycle 3 Day 1 given 10/21/22 - Complicated by pneumonia, requiring admission and rehab placement  Tafasitamab plus Revlimid (changed therapy due to patient and family's wishes due to intolerable toxicities), On 28 day cycles. - Cycle 1 Day 1 on 3/30/23 (HELD revlimid briefly starting 4/20/23 due to neutropenia) - Cycle 2 Day 1 on 5/11/23 (HELD Revlimid since end of May, not yet restarted due to prolonged neutropenia) - Cycle 3 Day 1 on 6/8/23 (On 6/29/23 during cycle 3 he had experienced hypotension down to 86/58 in setting of sore throat and chills, confusion with visual hallucinations in the setting of neutropenia and was sent to [facility] ED, delayed tx 1 week) - Cycle 4 day 1 on 7/15/23 - Cycle 5 Day 1 was HELD on 8/12/23 for persistent severe neutropenia without infection / complications, delayed until 8/26/23  ==================================================== [FreeTextEntry1] : Tafasitamab plus Revlimid 20 mg daily 21/28 days. Revlimid has been held since mid May 2023 due to Neutropenia. Also Tafasitamab has been intermittently held due to neutropenia, next date to hold will be 8/12/23 There are no Wet Read(s) to document.

## 2023-09-22 ENCOUNTER — APPOINTMENT (OUTPATIENT)
Dept: ORTHOPEDIC SURGERY | Facility: CLINIC | Age: 48
End: 2023-09-22
Payer: MEDICAID

## 2023-09-22 VITALS
DIASTOLIC BLOOD PRESSURE: 75 MMHG | WEIGHT: 137 LBS | BODY MASS INDEX: 20.29 KG/M2 | HEIGHT: 69 IN | HEART RATE: 70 BPM | SYSTOLIC BLOOD PRESSURE: 112 MMHG | TEMPERATURE: 97.9 F | OXYGEN SATURATION: 97 %

## 2023-09-22 DIAGNOSIS — M54.50 LOW BACK PAIN, UNSPECIFIED: ICD-10-CM

## 2023-09-22 DIAGNOSIS — M25.551 PAIN IN RIGHT HIP: ICD-10-CM

## 2023-09-22 DIAGNOSIS — G89.29 PAIN IN RIGHT HIP: ICD-10-CM

## 2023-09-22 PROCEDURE — 99215 OFFICE O/P EST HI 40 MIN: CPT | Mod: 25

## 2023-09-22 PROCEDURE — 72100 X-RAY EXAM L-S SPINE 2/3 VWS: CPT

## 2023-10-20 ENCOUNTER — RESULT REVIEW (OUTPATIENT)
Age: 48
End: 2023-10-20

## 2023-10-20 ENCOUNTER — OUTPATIENT (OUTPATIENT)
Dept: OUTPATIENT SERVICES | Facility: HOSPITAL | Age: 48
LOS: 1 days | End: 2023-10-20
Payer: MEDICAID

## 2023-10-20 ENCOUNTER — APPOINTMENT (OUTPATIENT)
Dept: MAMMOGRAPHY | Facility: HOSPITAL | Age: 48
End: 2023-10-20

## 2023-10-20 ENCOUNTER — APPOINTMENT (OUTPATIENT)
Dept: MRI IMAGING | Facility: HOSPITAL | Age: 48
End: 2023-10-20

## 2023-10-20 DIAGNOSIS — Z98.890 OTHER SPECIFIED POSTPROCEDURAL STATES: Chronic | ICD-10-CM

## 2023-10-20 PROCEDURE — 77067 SCR MAMMO BI INCL CAD: CPT | Mod: 26

## 2023-10-20 PROCEDURE — 72148 MRI LUMBAR SPINE W/O DYE: CPT

## 2023-10-20 PROCEDURE — 77063 BREAST TOMOSYNTHESIS BI: CPT

## 2023-10-20 PROCEDURE — 77063 BREAST TOMOSYNTHESIS BI: CPT | Mod: 26

## 2023-10-20 PROCEDURE — 77067 SCR MAMMO BI INCL CAD: CPT

## 2023-10-20 PROCEDURE — 72148 MRI LUMBAR SPINE W/O DYE: CPT | Mod: 26

## 2023-10-23 RX ORDER — TRAMADOL HYDROCHLORIDE 50 MG/1
0 TABLET ORAL
Qty: 0 | Refills: 0 | DISCHARGE

## 2023-10-23 RX ORDER — LIOTHYRONINE SODIUM 25 UG/1
1 TABLET ORAL
Qty: 0 | Refills: 0 | DISCHARGE

## 2025-05-09 NOTE — ASU PREOP CHECKLIST - BOWEL PREP
----- Message from Evans Vernon MD sent at 5/9/2025 11:27 AM CDT -----  Regarding: RE: CArdiology clearance  She is cleared cardiac-wise for planned surgery without further testing.  She has a history of paroxysmal AFib and had ablation previously.  Three day hold on DOAC.  Therapy  ----- Message -----  From: Tony Lion NP  Sent: 5/5/2025   4:12 PM CDT  To: Evans Vernon MD; Jessy Finn RN; [place]  Subject: CArdiology clearance                             Piter sharp, Ms. Lindo is scheduled for surgery 5/19/25 for Dr. Brice for left knee replacement.  I am  following up on a Cardiology E Consult request for clearance placed 4/20/25.  Is she Ok to proceed with this procedure?  Thank you, Tony Lion, [facility]  
n/a
